# Patient Record
Sex: MALE | Race: WHITE | NOT HISPANIC OR LATINO | Employment: OTHER | URBAN - METROPOLITAN AREA
[De-identification: names, ages, dates, MRNs, and addresses within clinical notes are randomized per-mention and may not be internally consistent; named-entity substitution may affect disease eponyms.]

---

## 2020-02-12 ENCOUNTER — RECORDS - HEALTHEAST (OUTPATIENT)
Dept: LAB | Facility: CLINIC | Age: 75
End: 2020-02-12

## 2020-02-12 LAB
ANION GAP SERPL CALCULATED.3IONS-SCNC: 13 MMOL/L (ref 5–18)
BUN SERPL-MCNC: 5 MG/DL (ref 8–28)
CALCIUM SERPL-MCNC: 10 MG/DL (ref 8.5–10.5)
CHLORIDE BLD-SCNC: 94 MMOL/L (ref 98–107)
CHOLEST SERPL-MCNC: 207 MG/DL
CO2 SERPL-SCNC: 24 MMOL/L (ref 22–31)
CREAT SERPL-MCNC: 0.88 MG/DL (ref 0.7–1.3)
FASTING STATUS PATIENT QL REPORTED: ABNORMAL
GFR SERPL CREATININE-BSD FRML MDRD: >60 ML/MIN/1.73M2
GLUCOSE BLD-MCNC: 113 MG/DL (ref 70–125)
HDLC SERPL-MCNC: 73 MG/DL
LDLC SERPL CALC-MCNC: 121 MG/DL
POTASSIUM BLD-SCNC: 5.1 MMOL/L (ref 3.5–5)
SODIUM SERPL-SCNC: 131 MMOL/L (ref 136–145)
TRIGL SERPL-MCNC: 63 MG/DL

## 2020-02-19 ENCOUNTER — RECORDS - HEALTHEAST (OUTPATIENT)
Dept: LAB | Facility: CLINIC | Age: 75
End: 2020-02-19

## 2020-02-19 LAB
ANION GAP SERPL CALCULATED.3IONS-SCNC: 12 MMOL/L (ref 5–18)
BUN SERPL-MCNC: 5 MG/DL (ref 8–28)
CALCIUM SERPL-MCNC: 10 MG/DL (ref 8.5–10.5)
CHLORIDE BLD-SCNC: 87 MMOL/L (ref 98–107)
CO2 SERPL-SCNC: 31 MMOL/L (ref 22–31)
CREAT SERPL-MCNC: 0.89 MG/DL (ref 0.7–1.3)
GFR SERPL CREATININE-BSD FRML MDRD: >60 ML/MIN/1.73M2
GLUCOSE BLD-MCNC: 123 MG/DL (ref 70–125)
POTASSIUM BLD-SCNC: 3.7 MMOL/L (ref 3.5–5)
SODIUM SERPL-SCNC: 130 MMOL/L (ref 136–145)

## 2022-07-07 ENCOUNTER — OFFICE VISIT (OUTPATIENT)
Dept: NEUROLOGY | Facility: CLINIC | Age: 77
End: 2022-07-07
Payer: MEDICARE

## 2022-07-07 VITALS
BODY MASS INDEX: 29.21 KG/M2 | HEART RATE: 104 BPM | HEIGHT: 69 IN | DIASTOLIC BLOOD PRESSURE: 70 MMHG | WEIGHT: 197.2 LBS | SYSTOLIC BLOOD PRESSURE: 128 MMHG

## 2022-07-07 DIAGNOSIS — Z86.79 HISTORY OF INTRACRANIAL HEMORRHAGE: ICD-10-CM

## 2022-07-07 DIAGNOSIS — Z87.898 HISTORY OF SEIZURES: Primary | ICD-10-CM

## 2022-07-07 PROCEDURE — 99204 OFFICE O/P NEW MOD 45 MIN: CPT | Performed by: PSYCHIATRY & NEUROLOGY

## 2022-07-07 NOTE — PROGRESS NOTES
INITIAL NEUROLOGY CONSULTATION - Transfer of Care, Not seen here since 2018    DATE OF VISIT: 7/7/2022  MRN: [id number]  PATIENT NAME: Jamila Hoang  YOB: 1945    REFERRING PROVIDER: No ref. provider found    Chief Complaint   Patient presents with     Seizures     New patient- no recent episode- DMV form       SUBJECTIVE:                                                      HPI:   Jamila Hoang is a 76 year old male here to establish care for seizures.  Per chart review, the patient was seen by Dr. Quesada in this clinic a few times throughout the years from 6967-2372.  Per chart review, the patient had a fall in July 2005 resulting in intracranial hemorrhage.  A couple of days later he had 2 seizures.  He was placed on Dilantin.  EEG showed left greater than right slowing 2005, follow-up study in 2006 was normal.  Early on there was discussion of switching to Lamictal.  In 2007, Dr. Quesada recommended tapering him off of the lamotrigine because he had been doing so well clinically and in the setting of the normal EEG.  While tapering off of the lamotrigine he had another EEG in 2007 which was again normal.  Note from 2008 indicates he was seizure-free off of the lamotrigine for 2 years at that point.  2018 DMV form indicates no recurrence of seizures.    Patient works as a .  Additional history of Dupuytren's contracture.    Jamila is here with his wife today.  They agreed there has been no recurrence of seizures.  No interim head injuries or other loss of consciousness.   He retired at age 71.  He says they take you out of the business at that age.    He denies headaches or visual changes.  He does use a walker for some problems he has with his left knee giving out.  He endorses one fall in the past when he could not have his walker on hand.  None recently.     Denies family history of seizures.    No past medical history on file.  Past Surgical History:   Procedure  "Laterality Date     SHOULDER SURGERY      right shoulder       No current outpatient medications on file prior to visit.  No current facility-administered medications on file prior to visit.    Allergies   Allergen Reactions     Seafood Unknown     Reports he does not eat shrimp, does not remember what type of reaction he had, he has eaten other seafood since.        Problem (# of Occurrences) Relation (Name,Age of Onset)    Cancer (1) Sister    No Known Problems (2) Mother, Father        Social History     Tobacco Use     Smoking status: Former Smoker     Smokeless tobacco: Current User     Types: Chew   Substance Use Topics     Alcohol use: Yes     Comment: Alcoholic Drinks/day: 3 cans per day       REVIEW OF SYSTEMS:                                                      10-point review of systems is negative except as mentioned above in HPI.     EXAM:                                                      Physical Exam:   Vitals: /70 (BP Location: Right arm, Patient Position: Sitting)   Pulse 104   Ht 1.753 m (5' 9\")   Wt 89.4 kg (197 lb 3.2 oz)   BMI 29.12 kg/m    BMI= Body mass index is 29.12 kg/m .  GENERAL: NAD.  HEENT: NC/AT.   CV: RRR. S1, S2.   NECK: No bruits.  PULM: Non-labored breathing.   EXTR: Dupuytren's contractures bilaterally.  Neurologic:  MENTAL STATUS: Alert, attentive. Speech is fluent. Normal comprehension. Normal concentration. Adequate fund of knowledge.   CRANIAL NERVES: Discs flat. Visual fields intact to confrontation. Pupils equally, round and reactive to light. Facial sensation and movement normal. EOM full. Hearing intact to conversation. Trapezius strength intact.  Tongue midline.  MOTOR: 5/5 in proximal and distal muscle groups of upper and lower extremities. Tone and bulk normal.   DTRs: Intact and symmetric in biceps, BR.  Intact at right patella, cannot elicit left patella or ankle jerks.  SENSATION: Normal light touch throughout. Vibration: Slightly decreased at both " ankles.   COORDINATION: Other than the contractures in his hands, fine motor movements appear normal.  STATION AND GAIT: Posture is slightly stooped.  Casual gait is cautious, uses walker to ambulate.    Relevant Data:  No head imaging available for my review.    ASSESSMENT and PLAN:                                                      Assessment:     ICD-10-CM    1. History of seizures  Z87.898    2. History of intracranial hemorrhage  Z86.79         Mr. Hoang is a pleasant 76-year-old man here for follow-up regarding one-time seizures in the setting of traumatic intracranial bleed.  He has been seizure-free for ~16 years.  He is mainly here because he is due for his DMV LOC form.  Given the timeframe that he has been seizure-free and the provoked seizures to begin with, I will also request that the DMV cancel his need for regular follow-up.  Otherwise, I am happy to see him back again at the 4-year point.  Jamila understands and agrees with the plan.    Plan:  -- We will submit your DMV form.  This allows you to drive for 4 years, so we may have to meet again at that time.  I will try to reach out to the DMV to have your restrictions canceled.  -- Please give us a call if any concerns arise.    Total Time: 45 minutes were spent with the patient and in chart review/documentation (as described above in Assessment and Plan) /coordinating the care on date of service.    Angelica Patterson MD  Neurology    CC: Phillip Trejo MD    Dragon software used in the dictation of this note.

## 2022-07-07 NOTE — LETTER
7/7/2022         RE: Jamila Hoang  [address]  [facility] [address]        Dear Colleague,    Thank you for referring your patient, Jamila Hoang, to the [facility] [place]. Please see a copy of my visit note below.    INITIAL NEUROLOGY CONSULTATION - Transfer of Care, Not seen here since 2018    DATE OF VISIT: 7/7/2022  MRN: [id number]  PATIENT NAME: Jamila Hoang  YOB: 1945    REFERRING PROVIDER: No ref. provider found    Chief Complaint   Patient presents with     Seizures     New patient- no recent episode- DMV form       SUBJECTIVE:                                                      HPI:   Jamila Hoang is a 76 year old male here to establish care for seizures.  Per chart review, the patient was seen by Dr. Quesada in this clinic a few times throughout the years from 7723-9314.  Per chart review, the patient had a fall in July 2005 resulting in intracranial hemorrhage.  A couple of days later he had 2 seizures.  He was placed on Dilantin.  EEG showed left greater than right slowing 2005, follow-up study in 2006 was normal.  Early on there was discussion of switching to Lamictal.  In 2007, Dr. Quesada recommended tapering him off of the lamotrigine because he had been doing so well clinically and in the setting of the normal EEG.  While tapering off of the lamotrigine he had another EEG in 2007 which was again normal.  Note from 2008 indicates he was seizure-free off of the lamotrigine for 2 years at that point.  2018 DMV form indicates no recurrence of seizures.    Patient works as a .  Additional history of Dupuytren's contracture.    Jamila is here with his wife today.  They agreed there has been no recurrence of seizures.  No interim head injuries or other loss of consciousness.   He retired at age 71.  He says they take you out of the business at that age.    He denies headaches or visual changes.  He does  "use a walker for some problems he has with his left knee giving out.  He endorses one fall in the past when he could not have his walker on hand.  None recently.     Denies family history of seizures.    No past medical history on file.  Past Surgical History:   Procedure Laterality Date     SHOULDER SURGERY      right shoulder       No current outpatient medications on file prior to visit.  No current facility-administered medications on file prior to visit.    Allergies   Allergen Reactions     Seafood Unknown     Reports he does not eat shrimp, does not remember what type of reaction he had, he has eaten other seafood since.        Problem (# of Occurrences) Relation (Name,Age of Onset)    Cancer (1) Sister    No Known Problems (2) Mother, Father        Social History     Tobacco Use     Smoking status: Former Smoker     Smokeless tobacco: Current User     Types: Chew   Substance Use Topics     Alcohol use: Yes     Comment: Alcoholic Drinks/day: 3 cans per day       REVIEW OF SYSTEMS:                                                      10-point review of systems is negative except as mentioned above in HPI.     EXAM:                                                      Physical Exam:   Vitals: /70 (BP Location: Right arm, Patient Position: Sitting)   Pulse 104   Ht 1.753 m (5' 9\")   Wt 89.4 kg (197 lb 3.2 oz)   BMI 29.12 kg/m    BMI= Body mass index is 29.12 kg/m .  GENERAL: NAD.  HEENT: NC/AT.   CV: RRR. S1, S2.   NECK: No bruits.  PULM: Non-labored breathing.   EXTR: Dupuytren's contractures bilaterally.  Neurologic:  MENTAL STATUS: Alert, attentive. Speech is fluent. Normal comprehension. Normal concentration. Adequate fund of knowledge.   CRANIAL NERVES: Discs flat. Visual fields intact to confrontation. Pupils equally, round and reactive to light. Facial sensation and movement normal. EOM full. Hearing intact to conversation. Trapezius strength intact.  Tongue midline.  MOTOR: 5/5 in proximal and " distal muscle groups of upper and lower extremities. Tone and bulk normal.   DTRs: Intact and symmetric in biceps, BR.  Intact at right patella, cannot elicit left patella or ankle jerks.  SENSATION: Normal light touch throughout. Vibration: Slightly decreased at both ankles.   COORDINATION: Other than the contractures in his hands, fine motor movements appear normal.  STATION AND GAIT: Posture is slightly stooped.  Casual gait is cautious, uses walker to ambulate.    Relevant Data:  No head imaging available for my review.    ASSESSMENT and PLAN:                                                      Assessment:     ICD-10-CM    1. History of seizures  Z87.898    2. History of intracranial hemorrhage  Z86.79         Mr. Hoang is a pleasant 76-year-old man here for follow-up regarding one-time seizures in the setting of traumatic intracranial bleed.  He has been seizure-free for ~16 years.  He is mainly here because he is due for his DMV LOC form.  Given the timeframe that he has been seizure-free and the provoked seizures to begin with, I will also request that the DMV cancel his need for regular follow-up.  Otherwise, I am happy to see him back again at the 4-year point.  Jamila understands and agrees with the plan.    Plan:  -- We will submit your DMV form.  This allows you to drive for 4 years, so we may have to meet again at that time.  I will try to reach out to the DMV to have your restrictions canceled.  -- Please give us a call if any concerns arise.    Total Time: 45 minutes were spent with the patient and in chart review/documentation (as described above in Assessment and Plan) /coordinating the care on date of service.    Angelica Patterson MD  Neurology    CC: Phillip Trejo MD    Dragon software used in the dictation of this note.        Again, thank you for allowing me to participate in the care of your patient.        Sincerely,        Angelica Patterson MD

## 2022-07-07 NOTE — NURSING NOTE
Chief Complaint   Patient presents with     Seizures     New patient- no recent episode- DMV form       Here with spouse Angela Esquivel -Jerod SALEH@ on 7/7/2022 at 10:09 AM

## 2022-07-07 NOTE — PATIENT INSTRUCTIONS
Plan:  -- We will submit your DMV form.  This allows you to drive for 4 years, so we may have to meet again at that time.  I will try to reach out to the DMV to have your restrictions canceled.  -- Please give us a call if any concerns arise.

## 2022-07-07 NOTE — LETTER
[facility] [address] [address]  [phone]      July 7, 2022    TO: [place] [employer],  and Vehicle services    RE: Jamila Hoang                                                                                                       [address]   [facility] [address]      To Whom it May Concern:    I am following Mr. Hoang in my neurology clinic for history of seizures in the setting of an intracranial bleed.  The seizures were provoked by the bleed and he has been free of any seizures, without medications, for about 16 years now.      In light of this I would like to request that his restrictions to be lifted so that he no longer has to do the loss of consciousness form every 4 years.    Please call our office if there are any questions.      Sincerely,         Angelica Patterson MD

## 2022-07-29 ENCOUNTER — LAB REQUISITION (OUTPATIENT)
Dept: LAB | Facility: CLINIC | Age: 77
End: 2022-07-29
Payer: MEDICARE

## 2022-07-29 DIAGNOSIS — Z12.5 ENCOUNTER FOR SCREENING FOR MALIGNANT NEOPLASM OF PROSTATE: ICD-10-CM

## 2022-07-29 DIAGNOSIS — E78.5 HYPERLIPIDEMIA, UNSPECIFIED: ICD-10-CM

## 2022-07-29 DIAGNOSIS — I10 ESSENTIAL (PRIMARY) HYPERTENSION: ICD-10-CM

## 2022-07-29 LAB
ALBUMIN SERPL BCG-MCNC: 4.5 G/DL (ref 3.5–5.2)
ALP SERPL-CCNC: 67 U/L (ref 40–129)
ALT SERPL W P-5'-P-CCNC: 19 U/L (ref 10–50)
ANION GAP SERPL CALCULATED.3IONS-SCNC: 10 MMOL/L (ref 7–15)
AST SERPL W P-5'-P-CCNC: 25 U/L (ref 10–50)
BILIRUB SERPL-MCNC: 0.6 MG/DL
BUN SERPL-MCNC: 6.1 MG/DL (ref 8–23)
CALCIUM SERPL-MCNC: 9.5 MG/DL (ref 8.8–10.2)
CHLORIDE SERPL-SCNC: 88 MMOL/L (ref 98–107)
CHOLEST SERPL-MCNC: 137 MG/DL
CREAT SERPL-MCNC: 0.78 MG/DL (ref 0.67–1.17)
DEPRECATED HCO3 PLAS-SCNC: 31 MMOL/L (ref 22–29)
GFR SERPL CREATININE-BSD FRML MDRD: >90 ML/MIN/1.73M2
GLUCOSE SERPL-MCNC: 92 MG/DL (ref 70–99)
HDLC SERPL-MCNC: 89 MG/DL
LDLC SERPL CALC-MCNC: 40 MG/DL
NONHDLC SERPL-MCNC: 48 MG/DL
POTASSIUM SERPL-SCNC: 3.8 MMOL/L (ref 3.4–5.3)
PROT SERPL-MCNC: 7.1 G/DL (ref 6.4–8.3)
PSA SERPL-MCNC: 0.49 NG/ML (ref 0–6.5)
SODIUM SERPL-SCNC: 129 MMOL/L (ref 136–145)
TRIGL SERPL-MCNC: 39 MG/DL

## 2022-07-29 PROCEDURE — 80053 COMPREHEN METABOLIC PANEL: CPT | Mod: ORL | Performed by: PHYSICIAN ASSISTANT

## 2022-07-29 PROCEDURE — 80061 LIPID PANEL: CPT | Mod: ORL | Performed by: PHYSICIAN ASSISTANT

## 2022-07-29 PROCEDURE — G0103 PSA SCREENING: HCPCS | Mod: ORL | Performed by: PHYSICIAN ASSISTANT

## 2022-08-15 ENCOUNTER — LAB REQUISITION (OUTPATIENT)
Dept: LAB | Facility: CLINIC | Age: 77
End: 2022-08-15
Payer: MEDICARE

## 2022-08-15 DIAGNOSIS — I10 ESSENTIAL (PRIMARY) HYPERTENSION: ICD-10-CM

## 2022-08-15 LAB
ANION GAP SERPL CALCULATED.3IONS-SCNC: 13 MMOL/L (ref 7–15)
BUN SERPL-MCNC: 6.9 MG/DL (ref 8–23)
CALCIUM SERPL-MCNC: 9.4 MG/DL (ref 8.8–10.2)
CHLORIDE SERPL-SCNC: 94 MMOL/L (ref 98–107)
CREAT SERPL-MCNC: 0.84 MG/DL (ref 0.67–1.17)
DEPRECATED HCO3 PLAS-SCNC: 26 MMOL/L (ref 22–29)
GFR SERPL CREATININE-BSD FRML MDRD: >90 ML/MIN/1.73M2
GLUCOSE SERPL-MCNC: 95 MG/DL (ref 70–99)
POTASSIUM SERPL-SCNC: 4.2 MMOL/L (ref 3.4–5.3)
SODIUM SERPL-SCNC: 133 MMOL/L (ref 136–145)

## 2022-08-15 PROCEDURE — 80048 BASIC METABOLIC PNL TOTAL CA: CPT | Mod: ORL | Performed by: PHYSICIAN ASSISTANT

## 2022-09-30 ENCOUNTER — LAB REQUISITION (OUTPATIENT)
Dept: LAB | Facility: CLINIC | Age: 77
End: 2022-09-30
Payer: MEDICARE

## 2022-09-30 DIAGNOSIS — I10 ESSENTIAL (PRIMARY) HYPERTENSION: ICD-10-CM

## 2022-09-30 LAB
ANION GAP SERPL CALCULATED.3IONS-SCNC: 13 MMOL/L (ref 7–15)
BUN SERPL-MCNC: 8.4 MG/DL (ref 8–23)
CALCIUM SERPL-MCNC: 9.3 MG/DL (ref 8.8–10.2)
CHLORIDE SERPL-SCNC: 90 MMOL/L (ref 98–107)
CREAT SERPL-MCNC: 0.83 MG/DL (ref 0.67–1.17)
DEPRECATED HCO3 PLAS-SCNC: 28 MMOL/L (ref 22–29)
GFR SERPL CREATININE-BSD FRML MDRD: >90 ML/MIN/1.73M2
GLUCOSE SERPL-MCNC: 117 MG/DL (ref 70–99)
POTASSIUM SERPL-SCNC: 3.7 MMOL/L (ref 3.4–5.3)
SODIUM SERPL-SCNC: 131 MMOL/L (ref 136–145)

## 2022-09-30 PROCEDURE — 80048 BASIC METABOLIC PNL TOTAL CA: CPT | Mod: ORL | Performed by: PHYSICIAN ASSISTANT

## 2023-06-14 ENCOUNTER — APPOINTMENT (OUTPATIENT)
Dept: CT IMAGING | Facility: HOSPITAL | Age: 78
End: 2023-06-14
Attending: EMERGENCY MEDICINE
Payer: MEDICARE

## 2023-06-14 ENCOUNTER — HOSPITAL ENCOUNTER (EMERGENCY)
Facility: HOSPITAL | Age: 78
Discharge: ANOTHER HOSPITAL | End: 2023-06-14
Attending: EMERGENCY MEDICINE | Admitting: EMERGENCY MEDICINE
Payer: MEDICARE

## 2023-06-14 ENCOUNTER — APPOINTMENT (OUTPATIENT)
Dept: RADIOLOGY | Facility: HOSPITAL | Age: 78
End: 2023-06-14
Attending: EMERGENCY MEDICINE
Payer: MEDICARE

## 2023-06-14 VITALS
OXYGEN SATURATION: 99 % | BODY MASS INDEX: 29.62 KG/M2 | HEART RATE: 97 BPM | WEIGHT: 200 LBS | HEIGHT: 69 IN | SYSTOLIC BLOOD PRESSURE: 133 MMHG | RESPIRATION RATE: 18 BRPM | DIASTOLIC BLOOD PRESSURE: 62 MMHG | TEMPERATURE: 99.2 F

## 2023-06-14 DIAGNOSIS — S32.471A CLOSED DISPLACED FRACTURE OF MEDIAL WALL OF RIGHT ACETABULUM, INITIAL ENCOUNTER (H): ICD-10-CM

## 2023-06-14 LAB
CRP SERPL-MCNC: 13.5 MG/L
HOLD SPECIMEN: NORMAL
MAGNESIUM SERPL-MCNC: 2.3 MG/DL (ref 1.7–2.3)

## 2023-06-14 PROCEDURE — 72170 X-RAY EXAM OF PELVIS: CPT

## 2023-06-14 PROCEDURE — 250N000011 HC RX IP 250 OP 636: Performed by: EMERGENCY MEDICINE

## 2023-06-14 PROCEDURE — 96374 THER/PROPH/DIAG INJ IV PUSH: CPT

## 2023-06-14 PROCEDURE — 73700 CT LOWER EXTREMITY W/O DYE: CPT | Mod: RT,MA

## 2023-06-14 PROCEDURE — 83735 ASSAY OF MAGNESIUM: CPT | Performed by: EMERGENCY MEDICINE

## 2023-06-14 PROCEDURE — 73552 X-RAY EXAM OF FEMUR 2/>: CPT | Mod: RT

## 2023-06-14 PROCEDURE — 86140 C-REACTIVE PROTEIN: CPT | Performed by: EMERGENCY MEDICINE

## 2023-06-14 PROCEDURE — 36415 COLL VENOUS BLD VENIPUNCTURE: CPT | Performed by: EMERGENCY MEDICINE

## 2023-06-14 PROCEDURE — 99285 EMERGENCY DEPT VISIT HI MDM: CPT | Mod: 25

## 2023-06-14 PROCEDURE — 70450 CT HEAD/BRAIN W/O DYE: CPT | Mod: MA

## 2023-06-14 RX ORDER — MORPHINE SULFATE 4 MG/ML
4 INJECTION, SOLUTION INTRAMUSCULAR; INTRAVENOUS ONCE
Status: COMPLETED | OUTPATIENT
Start: 2023-06-14 | End: 2023-06-14

## 2023-06-14 RX ADMIN — MORPHINE SULFATE 4 MG: 4 INJECTION, SOLUTION INTRAMUSCULAR; INTRAVENOUS at 17:57

## 2023-06-14 ASSESSMENT — ACTIVITIES OF DAILY LIVING (ADL)
ADLS_ACUITY_SCORE: 35

## 2023-06-14 NOTE — ED TRIAGE NOTES
"Pt arrived via [facility]. Pt woke up with extreme pain in his right groin that cause him to slide off the bed. Pt did not hit his head and is not on blood thinners. PT's wife states that he had \"one heck of a dream last night that I was screaming.\" Pt is unable to bear weight. CMS in tact but pt states its extremely painful to lift his leg.      Triage Assessment     Row Name 06/14/23 [id number]       Triage Assessment (Adult)    Airway WDL WDL       Respiratory WDL    Respiratory WDL WDL       Skin Circulation/Temperature WDL    Skin Circulation/Temperature WDL WDL       Cardiac WDL    Cardiac WDL WDL       Peripheral/Neurovascular WDL    Peripheral Neurovascular WDL WDL       Cognitive/Neuro/Behavioral WDL    Cognitive/Neuro/Behavioral WDL WDL              "

## 2023-06-14 NOTE — ED PROVIDER NOTES
"EMERGENCY DEPARTMENT NOTE     Name: Jamila Hoang    Age/Sex: 77 year old male   MRN: [id number]   Evaluation Date & Time:  6/14/2023  2:38 PM    PCP:    System, Provider Not In   ED Provider: Bradley Jim D.O.       CHIEF COMPLAINT    Leg Pain and Fall       DIAGNOSIS & DISPOSITION     1. Closed displaced fracture of medial wall of right acetabulum, initial encounter (H)      DISPOSITION: Transfer to [facility]    At the conclusion of the encounter I discussed the results of all of the tests and the disposition. The questions were answered. The patient or family acknowledged understanding and was agreeable with the care plan.    TOTAL CRITICAL CARE TIME (EXCLUDING PROCEDURES): Not applicable    PROCEDURES:   None    EMERGENCY DEPARTMENT COURSE/MEDICAL DECISION MAKING   2:53 PM I met with the patient to gather history and to perform my initial exam.  We discussed treatment options and the plan for care while in the Emergency Department.  5:25 PM Discussed the case with Dr. Lepe with [facility]  5:50 PM Discussed the case with [facility], Dr. Licea  6:00 PM I rechecked patient and discussed results and plan of transfer. Patient and wife, express preference of transfer to [facility] over  of .   6:05 PM Discussed the case with [facility], Dr. Patel, and plan to transfer patient to [facility] ED.     Jamila Hoang is a 77 year old male with relevant past history of hip fracture, HTN, alcoholism who presents to the emergency department for evaluation of right groin pain, provoked with right leg movement, with onset this morning causing patient to wake up.          Triage note reviewed:  Pt arrived via [facility]. Pt woke up with extreme pain in his right groin that cause him to slide off the bed. Pt did not hit his head and is not on blood thinners. PT's wife states that he had \"one heck of a dream last night that I was screaming.\" Pt is unable to bear weight. CMS in tact but pt states its extremely " painful to lift his leg.      Triage Assessment     Row Name 06/14/23 9830       Triage Assessment (Adult)    Airway WDL WDL       Respiratory WDL    Respiratory WDL WDL       Skin Circulation/Temperature WDL    Skin Circulation/Temperature WDL WDL       Cardiac WDL    Cardiac WDL WDL       Peripheral/Neurovascular WDL    Peripheral Neurovascular WDL WDL       Cognitive/Neuro/Behavioral WDL    Cognitive/Neuro/Behavioral WDL WDL                Differential  diagnosis considered included but not limited to hip, femur or pelvis fracture    Diagnostic studies:  Imaging:  CT Hip Right w/o Contrast   Final Result   IMPRESSION:   1.  Comminuted fractures of the right acetabulum, with involvement of the anterior and posterior columns, and extension across the obturator ring. Fracture components are mildly impacted and displaced, up to 1.1 cm.      2.  Moderate soft tissue swelling and ill-defined blood products within the soft tissues and muscles of the right hip and right pelvis. No intraperitoneal hematoma.      3.  Marked distention of the bladder.         Head CT w/o contrast   Final Result   IMPRESSION:   1.  No CT evidence for acute intracranial process.   2.  Brain atrophy and presumed chronic microvascular ischemic changes as above.   3.  Left frontal lobe encephalomalacia, postischemic versus posttraumatic.      XR Pelvis 1/2 Views   Final Result   IMPRESSION:   1.  Transverse fracture of the right superomedial acetabulum. This involves both the anterior and posterior columns. Mild superomedial displacement.    2.  Right coxa profunda, likely due to posttraumatic intrapelvic subluxation.   3.  Normal left hip joint alignment.   4.  ORIF left proximal femur fracture.   5.  Staple fixation of the tibial plateau.   6.  Degenerative changes in the lower lumbar spine.      XR Femur Right 2 Views   Final Result   IMPRESSION:   1.  Transverse fracture of the right superomedial acetabulum. This involves both the anterior  "and posterior columns. Mild superomedial displacement.    2.  Right coxa profunda, likely due to posttraumatic intrapelvic subluxation.   3.  Normal left hip joint alignment.   4.  ORIF left proximal femur fracture.   5.  Staple fixation of the tibial plateau.   6.  Degenerative changes in the lower lumbar spine.         Lab:  Labs Ordered and Resulted from Time of ED Arrival to Time of ED Departure   CRP INFLAMMATION - Abnormal       Result Value    CRP Inflammation 13.50 (*)    MAGNESIUM - Normal    Magnesium 2.3        Interventions:None  Discharge Vital Signs:/62   Pulse 97   Temp 99.2  F (37.3  C) (Temporal)   Resp 18   Ht 1.753 m (5' 9\")   Wt 90.7 kg (200 lb)   SpO2 99%   BMI 29.53 kg/m    Medical Decision Making  3 and CT of the hip demonstrated acetabular fracture with displacement.  Patient is denying need for pain medication while in the emergency department.  I discussed the case with [facility] and fracture is felt to be complex and in need of trauma orthopedics.  I discussed options with the patient and his wife and they would prefer to be admitted to [facility] versus [facility].  I contacted [facility] trauma service Dr. Busby who recommended transfer to the emergency department for admission at [facility].  I discussed case with Dr. Solorio and patient was excepted for transfer.    History:    Supplemental history from: Documented in chart, if applicable    External Record(s) reviewed: Documented in chart, if applicable.    Work Up:    Chart documentation includes differential considered and any EKGs or imaging independently interpreted by provider, where specified.    In additional to work up documented, I considered the following work up: Documented in chart, if applicable.    External consultation:    Discussion of management with another provider: Documented in chart, if applicable and Orthopedics    Complicating factors:    Care impacted by chronic " illness: N/A    Care affected by social determinants of health: N/A    Disposition considerations: Transfer to [facility]      @@@    ED INTERVENTIONS     Medications   morphine (PF) injection 4 mg (4 mg Intravenous $Given 6/14/23 1757)       DISCHARGE MEDICATIONS        Review of your medicines      You have not been prescribed any medications.           INFORMATION SOURCE AND LIMITATIONS    History/Exam limitations: None  Patient information was obtained from: Patient   Use of : N/A    HISTORY OF PRESENT ILLNESS   Jamila Hoang is a 77 year old year old male with a relevant past history of encephalopathy, HTN, alcoholism, hip fracture, who presents to this ED via EMS for evaluation of right groin pain.    Per triage not, the patient woke up to extreme right groin pain, causing him to slide off the bed.     The patient endorses right groin pain with movement, explaining that raising his knee provokes the pain. He denies any abdominal pain.     REVIEW OF SYSTEMS:   All other systems reviewed and are negative except as noted above in HPI.    PATIENT HISTORY   No past medical history on file.  Patient Active Problem List   Diagnosis     Hip fracture (H)     Hyponatremia     Fall     Alcohol intoxication (H)     Alcoholism (H)     Encephalopathy     Hypomagnesemia     Essential hypertension     Acute blood loss anemia     Past Surgical History:   Procedure Laterality Date     SHOULDER SURGERY      right shoulder       Allergies   Allergen Reactions     Amlodipine      Other reaction(s): edema/swelling, rash     Seafood Unknown     Reports he does not eat shrimp, does not remember what type of reaction he had, he has eaten other seafood since.       OUTPATIENT MEDICATIONS     There are no discharge medications for this patient.     Vitals:    06/14/23 1928 06/14/23 1930 06/14/23 1944 06/14/23 1947   BP: (!) 156/73 139/61 (!) 169/92 133/62   Pulse: 90 90 97 97   Resp:       Temp:       TempSrc:        SpO2: 96% 95% 99% 99%   Weight:       Height:           Physical Exam   Constitutional: Oriented to person, place, and time. Appears well-developed and well-nourished.   HEENT:    Head: Atraumatic.   Neck: Normal range of motion. Neck supple.   Cardiovascular: Normal rate, regular rhythm and normal heart sounds. Peripheral pulses sound.   Pulmonary/Chest: Normal effort  and breath sounds normal.   Abdominal: Soft. Bowel sounds are normal.   Musculoskeletal: Normal range of motion.   Neurological: Alert and oriented to person, place, and time. Normal strength. No sensory deficit. No cranial nerve deficit.  Skin: Skin is warm and dry.   Psychiatric: Normal mood and affect. Behavior is normal. Thought content normal.   GI: Circumcised, testicular non distended, no scrotal erythema or swelling.      DIAGNOSTICS    LABORATORY FINDINGS (REVIEWED AND INTERPRETED):  Labs Ordered and Resulted from Time of ED Arrival to Time of ED Departure   CRP INFLAMMATION - Abnormal       Result Value    CRP Inflammation 13.50 (*)    MAGNESIUM - Normal    Magnesium 2.3           IMAGING (REVIEWED AND INTERPRETED):  CT Hip Right w/o Contrast   Final Result   IMPRESSION:   1.  Comminuted fractures of the right acetabulum, with involvement of the anterior and posterior columns, and extension across the obturator ring. Fracture components are mildly impacted and displaced, up to 1.1 cm.      2.  Moderate soft tissue swelling and ill-defined blood products within the soft tissues and muscles of the right hip and right pelvis. No intraperitoneal hematoma.      3.  Marked distention of the bladder.         Head CT w/o contrast   Final Result   IMPRESSION:   1.  No CT evidence for acute intracranial process.   2.  Brain atrophy and presumed chronic microvascular ischemic changes as above.   3.  Left frontal lobe encephalomalacia, postischemic versus posttraumatic.      XR Pelvis 1/2 Views   Final Result   IMPRESSION:   1.  Transverse fracture  of the right superomedial acetabulum. This involves both the anterior and posterior columns. Mild superomedial displacement.    2.  Right coxa profunda, likely due to posttraumatic intrapelvic subluxation.   3.  Normal left hip joint alignment.   4.  ORIF left proximal femur fracture.   5.  Staple fixation of the tibial plateau.   6.  Degenerative changes in the lower lumbar spine.      XR Femur Right 2 Views   Final Result   IMPRESSION:   1.  Transverse fracture of the right superomedial acetabulum. This involves both the anterior and posterior columns. Mild superomedial displacement.    2.  Right coxa profunda, likely due to posttraumatic intrapelvic subluxation.   3.  Normal left hip joint alignment.   4.  ORIF left proximal femur fracture.   5.  Staple fixation of the tibial plateau.   6.  Degenerative changes in the lower lumbar spine.            I, Neel Patrick, am serving as a scribe to document services personally performed by Bradley Jim D.O., based on my observation and the provider s statements to me.    I, Bradley Jim D.O., attest that Neel Patrick is acting in a scribe capacity, has observed my performance of the services and has documented them in accordance with my direction.    Bradley Jim D.O.  EMERGENCY MEDICINE   06/14/23  [facility] EMERGENCY DEPARTMENT  [facility] [facility] [id number] [phone]  Dept: [phone]       Bradley Jim DO  06/15/23 0236

## 2023-06-15 NOTE — PROGRESS NOTES
Orthopedic Update:     I spoke with Dr. Jim regarding Jamila Hoang. He was brought to the emergency department for extreme right groin pain, with XR demonstrating displaced right transverse appearing acetabular fracture with femoral head protrusion seen on AP pelvis. Recommend transfer to trauma center for orthopedic traumatology management. Would recommend CT pelvis for further evaluation of fracture morphology. Close hemodynamic monitoring and serial exams. Bed rest, NWB RLE.     Arun Lepe MD   Orthopedic Surgery   [facility]

## 2023-06-22 ENCOUNTER — LAB REQUISITION (OUTPATIENT)
Dept: LAB | Facility: CLINIC | Age: 78
End: 2023-06-22
Payer: MEDICARE

## 2023-06-22 DIAGNOSIS — E87.1 HYPO-OSMOLALITY AND HYPONATREMIA: ICD-10-CM

## 2023-06-22 DIAGNOSIS — Z96.649 PRESENCE OF UNSPECIFIED ARTIFICIAL HIP JOINT: ICD-10-CM

## 2023-06-22 DIAGNOSIS — S42.102D FRACTURE OF UNSPECIFIED PART OF SCAPULA, LEFT SHOULDER, SUBSEQUENT ENCOUNTER FOR FRACTURE WITH ROUTINE HEALING: ICD-10-CM

## 2023-06-24 ENCOUNTER — LAB REQUISITION (OUTPATIENT)
Dept: LAB | Facility: CLINIC | Age: 78
End: 2023-06-24
Payer: MEDICARE

## 2023-06-24 DIAGNOSIS — E87.1 HYPO-OSMOLALITY AND HYPONATREMIA: ICD-10-CM

## 2023-06-26 LAB
ANION GAP SERPL CALCULATED.3IONS-SCNC: 9 MMOL/L (ref 7–15)
BASOPHILS # BLD AUTO: 0.1 10E3/UL (ref 0–0.2)
BASOPHILS NFR BLD AUTO: 1 %
BUN SERPL-MCNC: 10.8 MG/DL (ref 8–23)
CALCIUM SERPL-MCNC: 8.7 MG/DL (ref 8.8–10.2)
CHLORIDE SERPL-SCNC: 94 MMOL/L (ref 98–107)
CREAT SERPL-MCNC: 0.76 MG/DL (ref 0.67–1.17)
DEPRECATED HCO3 PLAS-SCNC: 27 MMOL/L (ref 22–29)
EOSINOPHIL # BLD AUTO: 0.3 10E3/UL (ref 0–0.7)
EOSINOPHIL NFR BLD AUTO: 3 %
ERYTHROCYTE [DISTWIDTH] IN BLOOD BY AUTOMATED COUNT: 14 % (ref 10–15)
GFR SERPL CREATININE-BSD FRML MDRD: >90 ML/MIN/1.73M2
GLUCOSE SERPL-MCNC: 103 MG/DL (ref 70–99)
HCT VFR BLD AUTO: 26.5 % (ref 40–53)
HGB BLD-MCNC: 8.9 G/DL (ref 13.3–17.7)
IMM GRANULOCYTES # BLD: 0.1 10E3/UL
IMM GRANULOCYTES NFR BLD: 1 %
LYMPHOCYTES # BLD AUTO: 1.4 10E3/UL (ref 0.8–5.3)
LYMPHOCYTES NFR BLD AUTO: 17 %
MCH RBC QN AUTO: 33 PG (ref 26.5–33)
MCHC RBC AUTO-ENTMCNC: 33.6 G/DL (ref 31.5–36.5)
MCV RBC AUTO: 98 FL (ref 78–100)
MONOCYTES # BLD AUTO: 0.9 10E3/UL (ref 0–1.3)
MONOCYTES NFR BLD AUTO: 10 %
NEUTROPHILS # BLD AUTO: 5.7 10E3/UL (ref 1.6–8.3)
NEUTROPHILS NFR BLD AUTO: 68 %
NRBC # BLD AUTO: 0 10E3/UL
NRBC BLD AUTO-RTO: 0 /100
PLATELET # BLD AUTO: 539 10E3/UL (ref 150–450)
POTASSIUM SERPL-SCNC: 4 MMOL/L (ref 3.4–5.3)
RBC # BLD AUTO: 2.7 10E6/UL (ref 4.4–5.9)
SODIUM SERPL-SCNC: 130 MMOL/L (ref 136–145)
WBC # BLD AUTO: 8.4 10E3/UL (ref 4–11)

## 2023-06-26 PROCEDURE — P9604 ONE-WAY ALLOW PRORATED TRIP: HCPCS | Performed by: NURSE PRACTITIONER

## 2023-06-26 PROCEDURE — 85025 COMPLETE CBC W/AUTO DIFF WBC: CPT | Performed by: NURSE PRACTITIONER

## 2023-06-26 PROCEDURE — 80048 BASIC METABOLIC PNL TOTAL CA: CPT | Performed by: NURSE PRACTITIONER

## 2023-06-26 PROCEDURE — 36415 COLL VENOUS BLD VENIPUNCTURE: CPT | Performed by: NURSE PRACTITIONER

## 2023-06-27 ENCOUNTER — LAB REQUISITION (OUTPATIENT)
Dept: LAB | Facility: CLINIC | Age: 78
End: 2023-06-27
Payer: MEDICARE

## 2023-06-28 ENCOUNTER — LAB REQUISITION (OUTPATIENT)
Dept: LAB | Facility: CLINIC | Age: 78
End: 2023-06-28
Payer: MEDICARE

## 2023-06-28 DIAGNOSIS — E87.1 HYPO-OSMOLALITY AND HYPONATREMIA: ICD-10-CM

## 2023-06-30 LAB
ANION GAP SERPL CALCULATED.3IONS-SCNC: 10 MMOL/L (ref 7–15)
BUN SERPL-MCNC: 10.7 MG/DL (ref 8–23)
CALCIUM SERPL-MCNC: 8.7 MG/DL (ref 8.8–10.2)
CHLORIDE SERPL-SCNC: 96 MMOL/L (ref 98–107)
CREAT SERPL-MCNC: 0.69 MG/DL (ref 0.67–1.17)
DEPRECATED HCO3 PLAS-SCNC: 25 MMOL/L (ref 22–29)
GFR SERPL CREATININE-BSD FRML MDRD: >90 ML/MIN/1.73M2
GLUCOSE SERPL-MCNC: 100 MG/DL (ref 70–99)
POTASSIUM SERPL-SCNC: 3.9 MMOL/L (ref 3.4–5.3)
SODIUM SERPL-SCNC: 131 MMOL/L (ref 136–145)

## 2023-06-30 PROCEDURE — 80048 BASIC METABOLIC PNL TOTAL CA: CPT | Performed by: NURSE PRACTITIONER

## 2023-06-30 PROCEDURE — 36415 COLL VENOUS BLD VENIPUNCTURE: CPT | Performed by: NURSE PRACTITIONER

## 2023-06-30 PROCEDURE — P9603 ONE-WAY ALLOW PRORATED MILES: HCPCS | Performed by: NURSE PRACTITIONER

## 2023-07-06 ENCOUNTER — ANCILLARY PROCEDURE (OUTPATIENT)
Dept: BONE DENSITY | Facility: CLINIC | Age: 78
End: 2023-07-06
Attending: FAMILY MEDICINE
Payer: COMMERCIAL

## 2023-07-06 DIAGNOSIS — M81.0 OSTEOPOROSIS: ICD-10-CM

## 2023-07-06 PROCEDURE — 77081 DXA BONE DENSITY APPENDICULR: CPT | Mod: TC | Performed by: RADIOLOGY

## 2023-07-06 PROCEDURE — 77080 DXA BONE DENSITY AXIAL: CPT | Mod: TC | Performed by: RADIOLOGY

## 2024-01-31 ENCOUNTER — LAB REQUISITION (OUTPATIENT)
Dept: LAB | Facility: CLINIC | Age: 79
End: 2024-01-31
Payer: MEDICARE

## 2024-01-31 DIAGNOSIS — I10 ESSENTIAL (PRIMARY) HYPERTENSION: ICD-10-CM

## 2024-01-31 DIAGNOSIS — E78.5 HYPERLIPIDEMIA, UNSPECIFIED: ICD-10-CM

## 2024-01-31 PROCEDURE — 80053 COMPREHEN METABOLIC PANEL: CPT | Mod: ORL | Performed by: PHYSICIAN ASSISTANT

## 2024-01-31 PROCEDURE — 80061 LIPID PANEL: CPT | Mod: ORL | Performed by: PHYSICIAN ASSISTANT

## 2024-02-01 LAB
ALBUMIN SERPL BCG-MCNC: 4 G/DL (ref 3.5–5.2)
ALP SERPL-CCNC: 72 U/L (ref 40–150)
ALT SERPL W P-5'-P-CCNC: 14 U/L (ref 0–70)
ANION GAP SERPL CALCULATED.3IONS-SCNC: 11 MMOL/L (ref 7–15)
AST SERPL W P-5'-P-CCNC: 23 U/L (ref 0–45)
BILIRUB SERPL-MCNC: 0.6 MG/DL
BUN SERPL-MCNC: 4.5 MG/DL (ref 8–23)
CALCIUM SERPL-MCNC: 9.1 MG/DL (ref 8.8–10.2)
CHLORIDE SERPL-SCNC: 89 MMOL/L (ref 98–107)
CHOLEST SERPL-MCNC: 147 MG/DL
CREAT SERPL-MCNC: 0.72 MG/DL (ref 0.67–1.17)
DEPRECATED HCO3 PLAS-SCNC: 29 MMOL/L (ref 22–29)
EGFRCR SERPLBLD CKD-EPI 2021: >90 ML/MIN/1.73M2
FASTING STATUS PATIENT QL REPORTED: NORMAL
GLUCOSE SERPL-MCNC: 95 MG/DL (ref 70–99)
HDLC SERPL-MCNC: 92 MG/DL
LDLC SERPL CALC-MCNC: 46 MG/DL
NONHDLC SERPL-MCNC: 55 MG/DL
POTASSIUM SERPL-SCNC: 3.5 MMOL/L (ref 3.4–5.3)
PROT SERPL-MCNC: 6.9 G/DL (ref 6.4–8.3)
SODIUM SERPL-SCNC: 129 MMOL/L (ref 135–145)
TRIGL SERPL-MCNC: 43 MG/DL

## 2025-07-09 ENCOUNTER — LAB REQUISITION (OUTPATIENT)
Dept: LAB | Facility: CLINIC | Age: 80
End: 2025-07-09
Payer: MEDICARE

## 2025-07-09 DIAGNOSIS — I10 ESSENTIAL (PRIMARY) HYPERTENSION: ICD-10-CM

## 2025-07-09 DIAGNOSIS — E78.5 HYPERLIPIDEMIA, UNSPECIFIED: ICD-10-CM

## 2025-07-09 PROCEDURE — 80053 COMPREHEN METABOLIC PANEL: CPT | Mod: ORL | Performed by: PHYSICIAN ASSISTANT

## 2025-07-09 PROCEDURE — 80061 LIPID PANEL: CPT | Mod: ORL | Performed by: PHYSICIAN ASSISTANT

## 2025-07-10 LAB
ALBUMIN SERPL BCG-MCNC: 4.5 G/DL (ref 3.5–5.2)
ALP SERPL-CCNC: 61 U/L (ref 40–150)
ALT SERPL W P-5'-P-CCNC: 20 U/L (ref 0–70)
ANION GAP SERPL CALCULATED.3IONS-SCNC: 13 MMOL/L (ref 7–15)
AST SERPL W P-5'-P-CCNC: 27 U/L (ref 0–45)
BILIRUB SERPL-MCNC: 0.5 MG/DL
BUN SERPL-MCNC: 5.9 MG/DL (ref 8–23)
CALCIUM SERPL-MCNC: 9.5 MG/DL (ref 8.8–10.4)
CHLORIDE SERPL-SCNC: 89 MMOL/L (ref 98–107)
CHOLEST SERPL-MCNC: 160 MG/DL
CREAT SERPL-MCNC: 0.81 MG/DL (ref 0.67–1.17)
EGFRCR SERPLBLD CKD-EPI 2021: 90 ML/MIN/1.73M2
FASTING STATUS PATIENT QL REPORTED: ABNORMAL
FASTING STATUS PATIENT QL REPORTED: NORMAL
GLUCOSE SERPL-MCNC: 114 MG/DL (ref 70–99)
HCO3 SERPL-SCNC: 25 MMOL/L (ref 22–29)
HDLC SERPL-MCNC: 117 MG/DL
LDLC SERPL CALC-MCNC: 37 MG/DL
NONHDLC SERPL-MCNC: 43 MG/DL
POTASSIUM SERPL-SCNC: 4.1 MMOL/L (ref 3.4–5.3)
PROT SERPL-MCNC: 7.3 G/DL (ref 6.4–8.3)
SODIUM SERPL-SCNC: 127 MMOL/L (ref 135–145)
TRIGL SERPL-MCNC: 32 MG/DL